# Patient Record
(demographics unavailable — no encounter records)

---

## 2024-10-10 NOTE — PHYSICAL EXAM
[Normal] : affect was normal and insight and judgment were intact [de-identified] : decreased bs in bases, some dullness to percussion on right.  [de-identified] : chest tube sitenearly healed.

## 2024-10-10 NOTE — HISTORY OF PRESENT ILLNESS
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  Hospitalized for severe sepsis due to aspiration pneumonia complicated by loculated pleural effusion requiring R chest tube and pleurodesis as well as AFRVR with spontaneous reversion. Not intubated. Echo low EF ?sepsis vs rate related (less likely) vs restenosis (less likely global HK), started on entresto c/b hypotension requiring dose adjustment.

## 2024-10-10 NOTE — ASSESSMENT
[FreeTextEntry1] : *recurrent aspiratin pna. This occurred soon after liberalization of diet. Has significant sputum production as noted by pt wife, will try to reduce microbial load using Closys gargle bid. Saw speech inpt, cont present diet. Has Pulm f/u, update decub CXR.  *PAF with RVR. Cardiology managing. EKG confirms NSR today. Check hgb 1 week post d/c, bnp for comparison/baseline. Cirilo'g entresto, eliquis, plavix, but wife thinks not taking plavix, COLLEEN 2/2024. Call tomorrow to re check. Update hgb  *T2DM. Gphage decreased inpt, update fructosamine and likely bump dose back up. Will take glucometer back to pharmacy.  *Cardiomyopathy. TTE 3 months planned. 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 Time 35min

## 2024-10-11 NOTE — ASSESSMENT
[Verbal] : Verbal [Demo] : Demo [Patient] : Patient [Spouse] : Spouse [Good - alert, interested, motivated] : Good - alert, interested, motivated [Verbalizes knowledge/Understanding] : Verbalizes knowledge/understanding [Dressing changes] : dressing changes [Skin Care] : skin care [Pressure relief] : pressure relief [Signs and symptoms of infection] : sign and symptoms of infection [Nutrition] : nutrition [How and When to Call] : how and when to call [Pain Management] : pain management [Patient responsibility to plan of care] : patient responsibility to plan of care [Stable] : stable [Home] : Home [Wheelchair] : Wheelchair [Not Applicable - Long Term Care/Home Health Agency] : Long Term Care/Home Health Agency: Not Applicable [] : No [FreeTextEntry2] : Infection prevention Localized wound care  Goal remaining pain free regarding wounds Enzymatic debridement  Frequent turning and repositioning  [FreeTextEntry3] : Increase in fibrinous wound base tissue and increase in measurements [FreeTextEntry4] : Supply order requested, wife performs dressing changes. Follow up in 1 week

## 2024-10-11 NOTE — HISTORY OF PRESENT ILLNESS
[FreeTextEntry1] : 74 yo WM, here for f/u of sacral press. ulcer that was first noticed in 8/24. Last visit here was 8/26/24. In the interim, pt was hospitalized recently for 2 wks with pneumonia.  Receiving PT at home. The ulcer is now completely covered with yellow slough. I emphasized to the pt/wife of the importance of offload/frequent change of position/rotation while in bed or chair and to increase standing/ambulation if possible and as much as possible throughout the day/night.

## 2024-10-11 NOTE — REVIEW OF SYSTEMS
[Negative] : Psychiatric [Feeling Tired] : feeling tired [Skin Wound] : skin wound [Limb Weakness] : limb weakness [Difficulty Walking] : difficulty walking [FreeTextEntry2] : CVA weakness [FreeTextEntry5] : hypertension, NSTEMI, hyperlipidemia [FreeTextEntry6] : sleep apnea [FreeTextEntry7] : GERD [FreeTextEntry8] : Nocturia,BPH [de-identified] : ulcer left lateral thigh [de-identified] : stroke [de-identified] : DM2

## 2024-10-11 NOTE — PLAN
[FreeTextEntry1] : offload; frequent rotation/change of position; increase standing/ambul. with assist cont PT collagenase, DD qd F/u- 2 wks  time spent 25 mins.

## 2024-10-11 NOTE — PHYSICAL EXAM
[Normal Thyroid] : the thyroid was normal [Normal Breath Sounds] : Normal breath sounds [Normal Heart Sounds] : normal heart sounds [Normal Rate and Rhythm] : normal rate and rhythm [Alert] : alert [Oriented to Person] : oriented to person [Oriented to Place] : oriented to place [Oriented to Time] : oriented to time [Calm] : calm [JVD] : no jugular venous distention  [Abdomen Masses] : No abdominal massess [Abdomen Tenderness] : ~T ~M No abdominal tenderness [Tender] : nontender [Enlarged] : not enlarged [de-identified] : elderly WM, NAD, alert, Ox3. [FreeTextEntry1] : sacrum  [FreeTextEntry2] : 1.1 [FreeTextEntry3] : 0.4 [FreeTextEntry4] : 0.1 [de-identified] : serosanguinous  [de-identified] : reactive hyperemia, resolved blanchable erythema when patient was offloading pressure [de-identified] : 100% [de-identified] : Santyl, Allevyn Foam [de-identified] : Mechanically cleansed with sterile gauze and normal saline. Cloth tape  [TWNoteComboBox4] : Small [TWNoteComboBox5] : No [TWNoteComboBox6] : Pressure [de-identified] : No [de-identified] : Normal [de-identified] : None [de-identified] : None [de-identified] : None [de-identified] : Yes [de-identified] : Daily [de-identified] : Primary Dressing

## 2024-10-11 NOTE — VITALS
[Pain related to present condition?] : The patient's  pain is not related to present condition. [] : No [de-identified] : 0/10

## 2024-10-14 NOTE — ASSESSMENT
[FreeTextEntry1] : Impression: This 73-year-old male patient is status post remote posterior circulation infarcts with signs of posterior circulation dysfunction predominantly affecting brainstem and cerebellum.  There is no evidence of recent CVA.  He has significant issues with speech and swallowing with recurrent aspiration pneumonia and recent hospitalization for loculated pleural effusion.  He also has a sacral decubitus.  There are other medical comorbidities including CAD status post MI stents new onset atrial fibrillation diabetes hyperlipidemia hypertension.  There has been weight loss and is significantly deconditioned following the recent hospital stay.    Recommendations: Continue baclofen 10 mg twice daily for recurrent hiccups spasticity.  Pulmonary follow-up.  Wound care follow-up.  Speech and physical therapy.  Aspiration precautions.  Multiple medications reviewed.  Guarded prognosis.  Office follow-up.

## 2024-10-14 NOTE — HISTORY OF PRESENT ILLNESS
[FreeTextEntry1] : This patient is seen for an office visit.  He is accompanied by his wife.  He is a 73-year-old patient known well to me status post recurrent posterior circulation strokes.  In this setting he has had recurrent aspiration pneumonia and was recently hospitalized from 9/18/2020 4 through 10-24.  He did have evidence of aspiration pneumonia and loculated pleural effusion requiring a chest tube and pleurodesis.  He is also been receiving treatment for sacral decubitus.  He has been deconditioned and is receiving laterally speech but physical therapy.  He continues to receive baclofen 10 mg twice daily for recurrent hiccups and spasticity.  Botox was given for left arm spasticity and was ineffective.  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   There is no evidence suggest recurrent CVA.  He has essentially retained cognitive functioning though his speech is dysarthric and ataxic.  He has chronic visual complaints left hemiparesis and right Kevin ataxia all chronic.  There is also a history of CAD cardiac stents hyperlipidemia diabetes.  At the time of the recent admission for the pulmonary condition was found to be in atrial fibrillation started on Eliquis and also low doses of Entresto.  His metformin was increased.

## 2024-10-14 NOTE — PHYSICAL EXAM
[FreeTextEntry1] : Head:  Normocephalic Neck: Restricted no carotid bruit.  Mental Status:  Alert Oriented X3 dysarthric hypophonic ataxic speech.  Cranial Nerves:  PERRL, extraocular movements reveal prominent nystagmus unchanged with no complaint of diplopia.  Visual fields full to confrontation.  No facial weakness.  Tongue deviates to the right without atrophy or fasciculation of the palate made symmetrically as a positive gag.  Motor: Chronic spastic left hemiparesis and right Kevin ataxia unchanged.  DTRs: Left Babinski.  Sensory: Response to pin and touch equally.  Gait: Wheelchair. Previously Declined (within the last year)

## 2024-10-14 NOTE — REASON FOR VISIT
[Follow-Up: _____] : a [unfilled] follow-up visit [FreeTextEntry1] : Status post remote posterior circulation CVAs

## 2024-10-25 NOTE — PHYSICAL EXAM
[Normal Thyroid] : the thyroid was normal [Normal Breath Sounds] : Normal breath sounds [Normal Heart Sounds] : normal heart sounds [Normal Rate and Rhythm] : normal rate and rhythm [Alert] : alert [Oriented to Person] : oriented to person [Oriented to Place] : oriented to place [Oriented to Time] : oriented to time [Calm] : calm [JVD] : no jugular venous distention  [Abdomen Masses] : No abdominal massess [Abdomen Tenderness] : ~T ~M No abdominal tenderness [Tender] : nontender [Enlarged] : not enlarged [de-identified] : elderly WM, NAD, alert, Ox3. [FreeTextEntry1] : sacrum  [FreeTextEntry2] : 0.9 [FreeTextEntry3] : 0.4 [FreeTextEntry4] : 0.1 [de-identified] : serosanguinous  [de-identified] : reactive hyperemia, resolved blanchable erythema when patient was offloading pressure [de-identified] : 100% [de-identified] : Santyl, Allevyn Foam [de-identified] : Mechanically cleansed with sterile gauze and normal saline. Cloth tape  [TWNoteComboBox4] : Small [TWNoteComboBox5] : No [TWNoteComboBox6] : Pressure [de-identified] : No [de-identified] : Normal [de-identified] : None [de-identified] : None [de-identified] : None [de-identified] : Yes [de-identified] : Daily [de-identified] : Primary Dressing

## 2024-10-25 NOTE — PLAN
[FreeTextEntry1] : increase standing/ambul. offload; frequent rotation/change of position. collagenase, allevyn qd  f/u 2 wks  time spent 20 mins.

## 2024-10-25 NOTE — HISTORY OF PRESENT ILLNESS
[FreeTextEntry1] : 74 yo WM, here for f/u of sacral press. ulcer that was first noticed in 8/24.Pt was hospitalized recently for 2 wks with pneumonia. Receiving PT at home. The ulcer is still covered with yellow slough, but has contracted somewhat. No SOI.  I emphasized to the pt/wife of the importance of offload/frequent change of position/rotation while in bed or chair and to increase standing/ambulation if possible and as much as possible throughout the day/night.

## 2024-10-25 NOTE — VITALS
[Pain related to present condition?] : The patient's  pain is not related to present condition. [] : No [de-identified] : 0/10 [FreeTextEntry5] :  Medication reconciliation completed

## 2024-10-25 NOTE — ASSESSMENT
[Verbal] : Verbal [Demo] : Demo [Patient] : Patient [Spouse] : Spouse [Good - alert, interested, motivated] : Good - alert, interested, motivated [Verbalizes knowledge/Understanding] : Verbalizes knowledge/understanding [Dressing changes] : dressing changes [Skin Care] : skin care [Pressure relief] : pressure relief [Signs and symptoms of infection] : sign and symptoms of infection [Nutrition] : nutrition [How and When to Call] : how and when to call [Pain Management] : pain management [Patient responsibility to plan of care] : patient responsibility to plan of care [Stable] : stable [Home] : Home [Wheelchair] : Wheelchair [Not Applicable - Long Term Care/Home Health Agency] : Long Term Care/Home Health Agency: Not Applicable [] : No [FreeTextEntry2] : Infection prevention Localized wound care  Goal remaining pain free regarding wounds Enzymatic debridement  Frequent turning and repositioning  [FreeTextEntry3] : Increase in fibrinous wound base tissue and increase in measurements [FreeTextEntry4] : Pt has adequate supply to change the dressings as ordered Follow up in 2 weeks

## 2024-10-25 NOTE — REVIEW OF SYSTEMS
[Feeling Tired] : feeling tired [Skin Wound] : skin wound [Limb Weakness] : limb weakness [Difficulty Walking] : difficulty walking [Negative] : Psychiatric [FreeTextEntry2] : CVA weakness [FreeTextEntry5] : hypertension, NSTEMI, hyperlipidemia [FreeTextEntry6] : sleep apnea [FreeTextEntry7] : GERD [FreeTextEntry8] : Nocturia,BPH [de-identified] : ulcer left lateral thigh [de-identified] : stroke [de-identified] : DM2

## 2024-11-08 NOTE — HISTORY OF PRESENT ILLNESS
[FreeTextEntry1] : 74 yo WM, here with his wife, for f/u of sacral press. ulcer that was first noticed in 8/24.Pt was hospitalized recently for 2 wks with pneumonia. Receiving PT at home. The ulcer is still covered with yellow slough, but has contracted somewhat. No SOI. I emphasized to the pt/wife of the importance of offload/frequent change of position/rotation while in bed or chair and to increase standing/ambulation if possible and as much as possible throughout the day/night.

## 2024-11-08 NOTE — REVIEW OF SYSTEMS
[Feeling Tired] : feeling tired [Skin Wound] : skin wound [Limb Weakness] : limb weakness [Difficulty Walking] : difficulty walking [Negative] : Psychiatric [FreeTextEntry2] : CVA weakness [FreeTextEntry5] : hypertension, NSTEMI, hyperlipidemia [FreeTextEntry6] : sleep apnea [FreeTextEntry7] : GERD [FreeTextEntry8] : Nocturia,BPH [de-identified] : ulcer left lateral thigh [de-identified] : stroke [de-identified] : DM2

## 2024-11-08 NOTE — VITALS
[Pain related to present condition?] : The patient's  pain is not related to present condition. [] : No [de-identified] : 0/10 [FreeTextEntry5] :  Medication reconciliation completed

## 2024-11-08 NOTE — HISTORY OF PRESENT ILLNESS
[FreeTextEntry1] : 72 yo WM, here with his wife, for f/u of sacral press. ulcer that was first noticed in 8/24.Pt was hospitalized recently for 2 wks with pneumonia. Receiving PT at home. The ulcer is still covered with yellow slough, but has contracted somewhat. No SOI. I emphasized to the pt/wife of the importance of offload/frequent change of position/rotation while in bed or chair and to increase standing/ambulation if possible and as much as possible throughout the day/night.

## 2024-11-08 NOTE — PLAN
[FreeTextEntry1] : offload;frequent rotation/change of position increase standing/ambulation with assist collagenase, allevyn qd  F/u-2 wks  time spent 25 mins.

## 2024-11-08 NOTE — PHYSICAL EXAM
[Normal Thyroid] : the thyroid was normal [Normal Breath Sounds] : Normal breath sounds [Normal Heart Sounds] : normal heart sounds [Normal Rate and Rhythm] : normal rate and rhythm [Alert] : alert [Oriented to Person] : oriented to person [Oriented to Place] : oriented to place [Oriented to Time] : oriented to time [Calm] : calm [JVD] : no jugular venous distention  [Abdomen Masses] : No abdominal massess [Abdomen Tenderness] : ~T ~M No abdominal tenderness [Tender] : nontender [Enlarged] : not enlarged [de-identified] : adult WM, NAD, alert, Ox3. [FreeTextEntry1] : sacrum  [FreeTextEntry2] : 0.9 [FreeTextEntry3] : 0.4 [FreeTextEntry4] : 0.1 [de-identified] : serosanguinous  [de-identified] : reactive hyperemia, resolved blanchable erythema when patient was offloading pressure [de-identified] : 100% [de-identified] : Santyl, Allevyn Foam [de-identified] : Mechanically cleansed with sterile gauze and normal saline. Cloth tape  [TWNoteComboBox4] : Small [TWNoteComboBox5] : No [TWNoteComboBox6] : Pressure [de-identified] : No [de-identified] : Normal [de-identified] : None [de-identified] : None [de-identified] : None [de-identified] : Yes [de-identified] : Daily [de-identified] : Primary Dressing

## 2024-11-08 NOTE — VITALS
[Pain related to present condition?] : The patient's  pain is not related to present condition. [] : No [de-identified] : 0/10 [FreeTextEntry5] :  Medication reconciliation completed

## 2024-11-08 NOTE — REVIEW OF SYSTEMS
[Feeling Tired] : feeling tired [Skin Wound] : skin wound [Limb Weakness] : limb weakness [Difficulty Walking] : difficulty walking [Negative] : Psychiatric [FreeTextEntry2] : CVA weakness [FreeTextEntry5] : hypertension, NSTEMI, hyperlipidemia [FreeTextEntry6] : sleep apnea [FreeTextEntry7] : GERD [FreeTextEntry8] : Nocturia,BPH [de-identified] : ulcer left lateral thigh [de-identified] : stroke [de-identified] : DM2

## 2024-11-08 NOTE — PHYSICAL EXAM
[Normal Thyroid] : the thyroid was normal [Normal Breath Sounds] : Normal breath sounds [Normal Heart Sounds] : normal heart sounds [Normal Rate and Rhythm] : normal rate and rhythm [Alert] : alert [Oriented to Person] : oriented to person [Oriented to Place] : oriented to place [Oriented to Time] : oriented to time [Calm] : calm [JVD] : no jugular venous distention  [Abdomen Masses] : No abdominal massess [Abdomen Tenderness] : ~T ~M No abdominal tenderness [Tender] : nontender [Enlarged] : not enlarged [de-identified] : adult WM, NAD, alert, Ox3. [FreeTextEntry1] : sacrum  [FreeTextEntry2] : 0.9 [FreeTextEntry3] : 0.4 [FreeTextEntry4] : 0.1 [de-identified] : serosanguinous  [de-identified] : reactive hyperemia, resolved blanchable erythema when patient was offloading pressure [de-identified] : 100% [de-identified] : Santyl, Allevyn Foam [de-identified] : Mechanically cleansed with sterile gauze and normal saline. Cloth tape  [TWNoteComboBox4] : Small [TWNoteComboBox5] : No [TWNoteComboBox6] : Pressure [de-identified] : No [de-identified] : Normal [de-identified] : None [de-identified] : None [de-identified] : None [de-identified] : Yes [de-identified] : Daily [de-identified] : Primary Dressing

## 2024-11-08 NOTE — ASSESSMENT
[Verbal] : Verbal [Demo] : Demo [Patient] : Patient [Spouse] : Spouse [Good - alert, interested, motivated] : Good - alert, interested, motivated [Verbalizes knowledge/Understanding] : Verbalizes knowledge/understanding [Dressing changes] : dressing changes [Skin Care] : skin care [Pressure relief] : pressure relief [Signs and symptoms of infection] : sign and symptoms of infection [Nutrition] : nutrition [How and When to Call] : how and when to call [Pain Management] : pain management [Patient responsibility to plan of care] : patient responsibility to plan of care [] : Yes [Stable] : stable [Home] : Home [Wheelchair] : Wheelchair [Not Applicable - Long Term Care/Home Health Agency] : Long Term Care/Home Health Agency: Not Applicable [FreeTextEntry2] : Infection prevention Localized wound care  Goal remaining pain free regarding wounds Enzymatic debridement  Frequent turning and repositioning  [FreeTextEntry4] : Pt has adequate supply to change the dressings as ordered Follow up in 2 weeks

## 2024-11-22 NOTE — VITALS
[Pain related to present condition?] : The patient's  pain is not related to present condition. [] : No [de-identified] : 0/10

## 2024-11-22 NOTE — VITALS
[Pain related to present condition?] : The patient's  pain is not related to present condition. [] : No [de-identified] : 0/10

## 2024-11-22 NOTE — REVIEW OF SYSTEMS
[Feeling Tired] : feeling tired [Skin Wound] : skin wound [Limb Weakness] : limb weakness [Difficulty Walking] : difficulty walking [Negative] : Psychiatric [FreeTextEntry2] : CVA weakness [FreeTextEntry5] : hypertension, NSTEMI, hyperlipidemia [FreeTextEntry6] : sleep apnea [FreeTextEntry7] : GERD [FreeTextEntry8] : Nocturia,BPH [de-identified] : stroke [de-identified] : ulcer left lateral thigh [de-identified] : DM2

## 2024-11-22 NOTE — REVIEW OF SYSTEMS
[Feeling Tired] : feeling tired [Skin Wound] : skin wound [Limb Weakness] : limb weakness [Difficulty Walking] : difficulty walking [Negative] : Psychiatric [FreeTextEntry2] : CVA weakness [FreeTextEntry5] : hypertension, NSTEMI, hyperlipidemia [FreeTextEntry6] : sleep apnea [FreeTextEntry7] : GERD [FreeTextEntry8] : Nocturia,BPH [de-identified] : ulcer left lateral thigh [de-identified] : stroke [de-identified] : DM2

## 2024-11-22 NOTE — PHYSICAL EXAM
[2 x 2] : 2 x 2  [JVD] : no jugular venous distention  [Normal Thyroid] : the thyroid was normal [Normal Breath Sounds] : Normal breath sounds [Normal Heart Sounds] : normal heart sounds [Normal Rate and Rhythm] : normal rate and rhythm [Abdomen Masses] : No abdominal massess [Abdomen Tenderness] : ~T ~M No abdominal tenderness [Tender] : nontender [Enlarged] : not enlarged [Alert] : alert [Oriented to Person] : oriented to person [Oriented to Place] : oriented to place [Oriented to Time] : oriented to time [Calm] : calm [de-identified] : elderly WM, NAD, alert, Ox3 [FreeTextEntry1] : sacrum  [FreeTextEntry2] : 0.9 [FreeTextEntry3] : 0.4 [FreeTextEntry4] : 0.1 [de-identified] : serosanguinous  [de-identified] : reactive hyperemia, resolved blanchable erythema when patient was offloading pressure [de-identified] : 100% [de-identified] : Santyl, Allevyn Foam [de-identified] : Mechanically cleansed with sterile gauze and normal saline. Cloth tape  [TWNoteComboBox4] : Small [TWNoteComboBox5] : No [TWNoteComboBox6] : Pressure [de-identified] : No [de-identified] : Normal [de-identified] : None [de-identified] : None [de-identified] : None [de-identified] : Yes [de-identified] : Daily [de-identified] : Primary Dressing

## 2024-11-22 NOTE — PLAN
[FreeTextEntry1] : increase standing/ambulation. collagenase, DD, allevyn qd  f/u 2 wks  time spent-25 mins.

## 2024-11-22 NOTE — PHYSICAL EXAM
[2 x 2] : 2 x 2  [JVD] : no jugular venous distention  [Normal Thyroid] : the thyroid was normal [Normal Breath Sounds] : Normal breath sounds [Normal Heart Sounds] : normal heart sounds [Normal Rate and Rhythm] : normal rate and rhythm [Abdomen Masses] : No abdominal massess [Abdomen Tenderness] : ~T ~M No abdominal tenderness [Tender] : nontender [Enlarged] : not enlarged [Alert] : alert [Oriented to Person] : oriented to person [Oriented to Place] : oriented to place [Oriented to Time] : oriented to time [Calm] : calm [de-identified] : elderly WM, NAD, alert, Ox3 [FreeTextEntry1] : sacrum  [FreeTextEntry2] : 0.9 [FreeTextEntry3] : 0.4 [FreeTextEntry4] : 0.1 [de-identified] : serosanguinous  [de-identified] : reactive hyperemia, resolved blanchable erythema when patient was offloading pressure [de-identified] : 100% [de-identified] : Santyl, Allevyn Foam [de-identified] : Mechanically cleansed with sterile gauze and normal saline. Cloth tape  [TWNoteComboBox4] : Small [TWNoteComboBox5] : No [TWNoteComboBox6] : Pressure [de-identified] : No [de-identified] : Normal [de-identified] : None [de-identified] : None [de-identified] : None [de-identified] : Yes [de-identified] : Daily [de-identified] : Primary Dressing

## 2024-12-06 NOTE — REVIEW OF SYSTEMS
[FreeTextEntry2] : CVA weakness [FreeTextEntry5] : hypertension, NSTEMI, hyperlipidemia [FreeTextEntry6] : sleep apnea [FreeTextEntry7] : GERD [FreeTextEntry8] : Nocturia,BPH [de-identified] : ulcer left lateral thigh [de-identified] : stroke [de-identified] : DM2

## 2024-12-06 NOTE — VITALS
[Pain related to present condition?] : The patient's  pain is not related to present condition. [] : No [de-identified] : 0/10

## 2024-12-06 NOTE — VITALS
[Pain related to present condition?] : The patient's  pain is not related to present condition. [] : No [de-identified] : 0/10

## 2024-12-06 NOTE — REVIEW OF SYSTEMS
[FreeTextEntry2] : CVA weakness [FreeTextEntry5] : hypertension, NSTEMI, hyperlipidemia [FreeTextEntry6] : sleep apnea [FreeTextEntry7] : GERD [FreeTextEntry8] : Nocturia,BPH [de-identified] : ulcer left lateral thigh [de-identified] : stroke [de-identified] : DM2

## 2024-12-06 NOTE — HISTORY OF PRESENT ILLNESS
[FreeTextEntry1] : 74 yo WM, here with his wife, for f/u of sacral press. ulcer that was first noticed in 8/24.Pt was hospitalized recently for 2 wks with pneumonia. Receiving PT at home. The ulcer is still covered with yellow slough, but has contracted somewhat. No SOI. I emphasized to the pt/wife of the importance of offload/frequent change of position/rotation while in bed or chair and to increase standing/ambulation if possible and as much as possible throughout the day/night.      Also with a new stage 1 right heel press. ulcer. Advised wearing heel boots while in bed and w/c.

## 2024-12-06 NOTE — PLAN
[FreeTextEntry1] : offload; frequent rotation/change of position increase standing/ambul. heel boots while in bed/and wheel chair collagenase, allevyn qd f/u 2 wks  time spent 25 mins.

## 2024-12-06 NOTE — ASSESSMENT
[Verbal] : Verbal [Demo] : Demo [Patient] : Patient [Spouse] : Spouse [Good - alert, interested, motivated] : Good - alert, interested, motivated [Verbalizes knowledge/Understanding] : Verbalizes knowledge/understanding [Dressing changes] : dressing changes [Skin Care] : skin care [Pressure relief] : pressure relief [Signs and symptoms of infection] : sign and symptoms of infection [Nutrition] : nutrition [How and When to Call] : how and when to call [Pain Management] : pain management [Patient responsibility to plan of care] : patient responsibility to plan of care [] : Yes [Stable] : stable [Home] : Home [Wheelchair] : Wheelchair [Not Applicable - Long Term Care/Home Health Agency] : Long Term Care/Home Health Agency: Not Applicable [Family member] : Family member [FreeTextEntry2] : Infection prevention Localized wound care  Goal remaining pain free regarding wounds Enzymatic debridement  Frequent turning and repositioning  [FreeTextEntry4] : Pt has adequate supply to change the dressings as ordered Follow up in 2 weeks

## 2024-12-06 NOTE — PHYSICAL EXAM
[2 x 2] : 2 x 2  [JVD] : no jugular venous distention  [Ankle Swelling (On Exam)] : not present [Abdomen Masses] : No abdominal massess [Abdomen Tenderness] : ~T ~M No abdominal tenderness [Tender] : nontender [Enlarged] : not enlarged [de-identified] : elderly WM, NAD, alert, Ox3. [FreeTextEntry2] : 0.7 [FreeTextEntry1] : sacrum  [FreeTextEntry3] : 0.2 [FreeTextEntry4] : 0.1 [de-identified] : serosanguinous  [de-identified] : 50% [de-identified] : Santyl, Allevyn Foam [de-identified] : Mechanically cleansed with sterile gauze and normal saline. Cloth tape  [TWNoteComboBox4] : Small [TWNoteComboBox5] : No [TWNoteComboBox6] : Pressure [de-identified] : No [de-identified] : Normal [de-identified] : None [de-identified] : None [de-identified] : 50% [de-identified] : Yes [de-identified] : Daily [de-identified] : Primary Dressing

## 2024-12-06 NOTE — PHYSICAL EXAM
[2 x 2] : 2 x 2  [JVD] : no jugular venous distention  [Ankle Swelling (On Exam)] : not present [Abdomen Masses] : No abdominal massess [Abdomen Tenderness] : ~T ~M No abdominal tenderness [Tender] : nontender [Enlarged] : not enlarged [de-identified] : elderly WM, NAD, alert, Ox3. [FreeTextEntry2] : 0.7 [FreeTextEntry1] : sacrum  [FreeTextEntry3] : 0.2 [FreeTextEntry4] : 0.1 [de-identified] : serosanguinous  [de-identified] : 50% [de-identified] : Santyl, Allevyn Foam [de-identified] : Mechanically cleansed with sterile gauze and normal saline. Cloth tape  [TWNoteComboBox4] : Small [TWNoteComboBox5] : No [TWNoteComboBox6] : Pressure [de-identified] : No [de-identified] : Normal [de-identified] : None [de-identified] : None [de-identified] : 50% [de-identified] : Yes [de-identified] : Daily [de-identified] : Primary Dressing

## 2024-12-21 NOTE — REVIEW OF SYSTEMS
[Feeling Tired] : feeling tired [Limb Weakness] : limb weakness [Difficulty Walking] : difficulty walking [Negative] : Psychiatric [FreeTextEntry2] : CVA weakness [FreeTextEntry5] : hypertension, NSTEMI, hyperlipidemia [FreeTextEntry6] : sleep apnea [FreeTextEntry7] : GERD [FreeTextEntry8] : Nocturia,BPH [de-identified] : stroke [de-identified] : ulcer left lateral thigh [de-identified] : DM2

## 2024-12-21 NOTE — ASSESSMENT
[Verbal] : Verbal [Demo] : Demo [Patient] : Patient [Spouse] : Spouse [Family member] : Family member [Good - alert, interested, motivated] : Good - alert, interested, motivated [Verbalizes knowledge/Understanding] : Verbalizes knowledge/understanding [Dressing changes] : dressing changes [Skin Care] : skin care [Pressure relief] : pressure relief [Signs and symptoms of infection] : sign and symptoms of infection [Nutrition] : nutrition [How and When to Call] : how and when to call [Pain Management] : pain management [Patient responsibility to plan of care] : patient responsibility to plan of care [] : Yes [Stable] : stable [Home] : Home [Wheelchair] : Wheelchair [Not Applicable - Long Term Care/Home Health Agency] : Long Term Care/Home Health Agency: Not Applicable [FreeTextEntry2] : Infection prevention Localized wound care  Goal remaining pain free regarding wounds Frequent turning and repositioning  [FreeTextEntry4] : Pt has adequate supply to change the dressings as ordered Follow up in 3 weeks

## 2024-12-21 NOTE — PHYSICAL EXAM
[2 x 2] : 2 x 2  [Normal Thyroid] : the thyroid was normal [Normal Breath Sounds] : Normal breath sounds [Normal Heart Sounds] : normal heart sounds [Normal Rate and Rhythm] : normal rate and rhythm [Ankle Swelling (On Exam)] : present [Ankle Swelling Bilaterally] : bilaterally  [Ankle Swelling On The Left] : moderate [Alert] : alert [Oriented to Person] : oriented to person [Oriented to Place] : oriented to place [Oriented to Time] : oriented to time [Calm] : calm [JVD] : no jugular venous distention  [Abdomen Masses] : No abdominal massess [Abdomen Tenderness] : ~T ~M No abdominal tenderness [Tender] : nontender [Enlarged] : not enlarged [de-identified] : elderly WM, NAD, alert, Ox3 [FreeTextEntry1] : sacrum- fragile epithelial tissue [de-identified] :  Allevyn Foam [de-identified] : Mechanically cleansed with sterile gauze and normal saline. Cloth tape  [TWNoteComboBox4] : None [TWNoteComboBox5] : No [TWNoteComboBox6] : Pressure [de-identified] : No [de-identified] : Normal [de-identified] : None [de-identified] : None [de-identified] : None [de-identified] : No [de-identified] : Every other day [de-identified] : Primary Dressing

## 2024-12-21 NOTE — HISTORY OF PRESENT ILLNESS
[FreeTextEntry1] : 72 yo WM, here with his wife, for f/u of sacral press. ulcer that was first noticed in 8/24.Pt was hospitalized recently for 2 wks with pneumonia. Receiving PT at home. The ulcer has healed.  I emphasized to the pt/wife of the importance of offload/frequent change of position/rotation while in bed or chair and to increase standing/ambulation if possible and as much as possible throughout the day/night.      The left heel stage 1 press. ulcer is stable and skin remains intact. Wearing heel boots. Using roho cushion in his wheelchair.

## 2024-12-21 NOTE — PLAN
[FreeTextEntry1] : offload; frequent rotation/change of position heel boots allevyn qod f/u 3 wks  time spent 25 mins.

## 2025-01-11 NOTE — ASSESSMENT
[Verbal] : Verbal [Written] : Written [Demo] : Demo [Patient] : Patient [Spouse] : Spouse [Good - alert, interested, motivated] : Good - alert, interested, motivated [Demonstrates independently] : demonstrates independently [Skin Care] : skin care [Pressure relief] : pressure relief [Signs and symptoms of infection] : sign and symptoms of infection [Nutrition] : nutrition [How and When to Call] : how and when to call [Patient responsibility to plan of care] : patient responsibility to plan of care [] : Yes [FreeTextEntry2] : Infection prevention  Maintain optimal skin integrity to high pressure areas Pressure relief/ Pressure redistribution Offloading the stress on skin structures and decreasing potential pathologic biomechanical influences. Frequent turning and repositioning q 2 hrs. [FreeTextEntry4] : Wound closed

## 2025-01-11 NOTE — REVIEW OF SYSTEMS
[Feeling Tired] : feeling tired [Limb Weakness] : limb weakness [Difficulty Walking] : difficulty walking [Negative] : Psychiatric [FreeTextEntry2] : CVA weakness [FreeTextEntry5] : hypertension, NSTEMI, hyperlipidemia [FreeTextEntry6] : sleep apnea [FreeTextEntry7] : GERD [FreeTextEntry8] : Nocturia,BPH [de-identified] : ulcer left lateral thigh [de-identified] : stroke [de-identified] : DM2

## 2025-01-11 NOTE — PHYSICAL EXAM
[JVD] : no jugular venous distention  [Normal Thyroid] : the thyroid was normal [Normal Breath Sounds] : Normal breath sounds [Normal Heart Sounds] : normal heart sounds [Normal Rate and Rhythm] : normal rate and rhythm [Ankle Swelling (On Exam)] : present [Ankle Swelling Bilaterally] : bilaterally  [Ankle Swelling On The Left] : moderate [Abdomen Masses] : No abdominal massess [Abdomen Tenderness] : ~T ~M No abdominal tenderness [Tender] : nontender [Enlarged] : not enlarged [Alert] : alert [Oriented to Person] : oriented to person [Oriented to Place] : oriented to place [Oriented to Time] : oriented to time [Calm] : calm [de-identified] : elderly WM, NAD, alert, Ox3 [de-identified] : No open wounds [FreeTextEntry1] : Sacral / Decubitus ulcer; closed [de-identified] : none [de-identified] : pressure [de-identified] : blanchable erythema [de-identified] : none [de-identified] : none [de-identified] : none [de-identified] : none [TWNoteComboBox4] : None [de-identified] : No

## 2025-01-11 NOTE — PLAN
[FreeTextEntry1] : offload; frequent rotation/change of position all areas resolved.  may continue allevyn foam discharged  time spent 25 mins.

## 2025-01-11 NOTE — HISTORY OF PRESENT ILLNESS
[FreeTextEntry1] : 74 yo WM, here with his wife, for f/u of sacral press. ulcer that was first noticed in 8/24.Pt was hospitalized recently for 2 wks with pneumonia. Receiving PT at home. The ulcer has healed.  I emphasized to the pt/wife of the importance of offload/frequent change of position/rotation while in bed or chair and to increase standing/ambulation if possible and as much as possible throughout the day/night.      The left heel stage 1 press. ulcer is stable and skin remains intact. Wearing heel boots. Using roho cushion in his wheelchair. 1/11/25 both left heel and sacral area s resolved. No SOI

## 2025-01-31 NOTE — HISTORY OF PRESENT ILLNESS
[FreeTextEntry1] : inferior wall MI - late presentation s/p RCA PCI Stress test with mult regions of ischemia - s/p PCI to OM 2/24 Recent admission for PNA and new AF  Feels well No CP No LE edema Compliant with meds No dizziness or palps

## 2025-01-31 NOTE — DISCUSSION/SUMMARY
[EKG obtained to assist in diagnosis and management of assessed problem(s)] : EKG obtained to assist in diagnosis and management of assessed problem(s) [FreeTextEntry1] : Increase entresto (full pill BID) c/w Eliquis/Plavix CAD - no angina, (s/p recent PCI to OM, LAD disease noted) HTN- BP ok to titrate dose Hchol-  c/w current meds  Echo reviewed  RTO 4-6 mo

## 2025-02-18 NOTE — REASON FOR VISIT
[Follow-Up: _____] : a [unfilled] follow-up visit [FreeTextEntry1] : Status post remote posterior circulation strokes

## 2025-02-18 NOTE — ASSESSMENT
[FreeTextEntry1] : Impression: This 74-year-old male patient is status post remote multiple posterior circulation infarcts involving the brainstem and cerebellum.  He has no evidence of recent CVA and is neurologically stable.  He has issues with speech swallowing recurrent aspiration pneumonia status post sacral decubitus resolved status post MI stents atrial fibrillation diabetes hyperlipidemia hypertension now taking Entresto.  Recommendations: Continue baclofen 10 mg twice daily and his multiple medications have been reviewed would make no change from the neurological standpoint.  Speech therapy physical therapy aspiration precautions office follow-up.

## 2025-02-18 NOTE — HISTORY OF PRESENT ILLNESS
[FreeTextEntry1] : Patient is seen for an office visit accompanied by his wife.  He is a 73-year-old male patient last seen by me on 10/14/2024 status post recurrent strokes affecting brainstem and cerebellum.  He has a history of recurrent pneumonia presumed to be aspiration but stable in this regard since his last visit on 10/14/2024.  He is now seeing a different pulmonologist.  He has a history of CAD status post MI and follows with cardiology.  There is a history of diabetes.  His sacral decubitus has healed and he is no longer seeing wound care.  He receives physical therapy.  He receives speech therapy.  He is now eating solid food chopped instead of puree at his request without difficulty.  He is at chronic visual problems with the right eye for which he follows regularly ophthalmology  He continues to receive baclofen 10 mg twice daily for history of recurrent hiccups and spasticity.  He has had Botox trials for left arm spasticity without effect.  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  There is a history of CAD cardiac stents hyperlipidemia diabetes atrial fibrillation aspiration pneumonia.

## 2025-02-18 NOTE — PHYSICAL EXAM
[FreeTextEntry1] : Head:  Normocephalic Neck: Restricted no carotid bruit.  Mental Status:  Alert Oriented X3 dysarthric hypophonic ataxic speech.  Cranial Nerves:  PERRL, extraocular movements reveal prominent nystagmus unchanged with no complaint of diplopia.  Visual fields full to confrontation.  No facial weakness.  Tongue deviates slightly to the right.  Palate elevates symmetrically.  The gag is diminished.  Motor: Chronic spastic left hemiparesis and right Kevin ataxia unchanged.  DTRs: Left Babinski.  Sensory: Response to pin and touch equally.  Gait: Wheelchair.

## 2025-02-21 NOTE — PHYSICAL EXAM
[No CVA Tenderness] : no CVA  tenderness [No Spinal Tenderness] : no spinal tenderness [Kyphosis] : kyphosis [Normal] : affect was normal and insight and judgment were intact [de-identified] : Mild contractures left upper extremity [de-identified] : No decubitus ulcer.  Livedo reticularis. [de-identified] : Dysarthric speech.  Left arm and left leg spasticity.

## 2025-02-21 NOTE — ASSESSMENT
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 Essential complete resolution other than some loss of ability to run, but spontaneous recurrence 1990 possibly related to residual scarring causing Wallenberg syndrome.. Left hemiparesis with spasticity, wheelchair-bound, recurrent aspiration pneumonitis.  *recurrent aspiratin pna. Allergies to weed roaches grasses sees Dr. Wells. 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 Seeing Pulmonologist.  *PAF with RVR. Cardiology managing. EKG confirms NSR today. Check hgb 1 week post d/c, bnp for comparison/baseline. Cirilo'g entresto, eliquis, plavix, but wife thinks not taking plavix, COLLEEN 2/2024. Call tomorrow to re check. Update hgb  *Cardiomyopathy.  *CAD. Late presenting NSTEMI RCA stented 2020. Moderate LAD RCA RPL disease, OM stented 2/2024, TTE 1/2025 55-60% Entresto increased 1/2025 w/o hypotension.  Also denies lightheadedness with position changes.  *Type 2 diabetes. A1c 7.4% 2018 more recently upper sixes on 1000 mg twice daily metformin statin aspirin.  Update labs April 2025 sees eye doctor. No foot ulceration. No stocking paresthesia.  *Urge incontinence. 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 Wears condom cath at night. Sees Dr. Costa.  *Routine adult health maintenance.  Vaccinations: Tdap 2015 Prevnar 20 2022 Shingrix eligible COVID monovalent eligible flu eligible. Colon cancer screening:  Cologuard - June 2019. Limited colonoscopy negative for polyps August 2022. Cologuard this year.  Update annual labs 4/2025.  It was a pleasure to visit with Mr. and Ms. Perry today. Answer all questions best I could. Disposition return for suture removal flu shot Monday. Follow-up 6 months CPE, call labs in a couple of months. [id number]

## 2025-02-21 NOTE — HISTORY OF PRESENT ILLNESS
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 Payton in cardiology for late presenting inferior wall MI status post PCI to the RCA in 2020.

## 2025-04-15 NOTE — HISTORY OF PRESENT ILLNESS
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  Patient had no significant carotid stenosis on 2020 ultrasound.  Sees Dr. Payton in cardiology for late presenting inferior wall MI status post PCI to the RCA in 2020.  Tolerating Entresto up titration.

## 2025-04-15 NOTE — ASSESSMENT
[FreeTextEntry1] : *Right eye proliferative retinopathy.  Question has been raised by his eye doctors regarding atheroembolism apparently, clean carotids by ultrasound 5 years ago. Given R occipital lobe infarct (affecting left visual field), we need protect R visual field as best we can. Exam would not suggest substantial stenosis today however we will update the carotid ultrasound to be sure in order to potentially further protect vision.  Certainly we are aggressively managing his blood pressure cholesterol and diabetes, updating labs to verify this tomorrow.  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 Essential complete resolution other than some loss of ability to run, but spontaneous recurrence 1990 possibly related to residual scarring, atheroembolism, causing Wallenberg syndrome.. Left hemiparesis with spasticity, wheelchair-bound, recurrent aspiration pneumonitis and/or difficulty clearing secretions. MRI 2024: "Numerous bilateral cerebellar hemisphere infarctions, right worse than left. Ex vacuo dilation of fourth ventricle. Chronic moderate size right of midline pontine infarction extending into the right middle cerebral peduncle. Chronic posterior high right frontal lobe cortical infarctions. Chronic right occipital lobe infarction. Moderate volume loss of the midbrain, pooja, and medulla, unchanged. "  *recurrent aspiratin pna. Allergies to weed roaches grasses sees Dr. Wells. 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 Seeing Pulmonologist. However for hypoxia relative to the patient's baseline and abnormal right lung exam will obtain chest x-ray and start him on azithromycin and cefdinir.  I have asked them to increase nebulized saline to hopefully mobilize secretions better to 3 times daily until sats recover, and use a flutter valve after nebulization.  *PAF with RVR. Cardiology managing. Cirilo'g entresto, eliquis, plavix, but wife thinks not taking plavix, COLLEEN 2/2024. Call tomorrow to re check. Update hgb, ferritin  *Cardiomyopathy. *CAD. Late presenting NSTEMI RCA stented 2020. Moderate LAD RCA RPL disease, OM stented 2/2024, TTE 1/2025 55-60% Entresto increased 1/2025 w/o hypotension. Also denies lightheadedness with position changes.  *Type 2 diabetes. A1c 7.4% 2018 more recently upper sixes on 1000 mg twice daily metformin statin aspirin. Update labs April 2025 sees eye doctor. No foot ulceration. No stocking paresthesia.  *Urge incontinence. 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 Wears condom cath at night. Sees Dr. Costa.  *Routine adult health maintenance. Vaccinations: Tdap 2015 Prevnar 20 2022 Shingrix eligible COVID monovalent eligible flu eligible. Colon cancer screening: Cologuard - June 2019. Limited colonoscopy negative for polyps August 2022. Cologuard this year.  Update annual labs 4/2025.  It was a pleasure to visit with . and Ms. Perry today. Answer all questions best I could. Disposition Follow-up 6 months CPE, portal results [id number]

## 2025-04-15 NOTE — HISTORY OF PRESENT ILLNESS
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  Patient had no significant carotid stenosis on 2020 ultrasound.  Sees Dr. Payton in cardiology for late presenting inferior wall MI status post PCI to the RCA in 2020.  Tolerating Entresto up titration.

## 2025-04-15 NOTE — PHYSICAL EXAM
[Normal] : affect was normal and insight and judgment were intact [de-identified] : Repeat pulse oximetry on warm fingers 92%.  More rhonchi than usual with coughing. [de-identified] : Dullness to percussion in the right lung base with coarse rhonchi throughout the right lung. [de-identified] : Brisk carotid upstrokes no carotid bruits

## 2025-04-15 NOTE — ASSESSMENT
[FreeTextEntry1] : *Right eye proliferative retinopathy.  Question has been raised by his eye doctors regarding atheroembolism apparently, clean carotids by ultrasound 5 years ago. Given R occipital lobe infarct (affecting left visual field), we need protect R visual field as best we can. Exam would not suggest substantial stenosis today however we will update the carotid ultrasound to be sure in order to potentially further protect vision.  Certainly we are aggressively managing his blood pressure cholesterol and diabetes, updating labs to verify this tomorrow.  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 Essential complete resolution other than some loss of ability to run, but spontaneous recurrence 1990 possibly related to residual scarring, atheroembolism, causing Wallenberg syndrome.. Left hemiparesis with spasticity, wheelchair-bound, recurrent aspiration pneumonitis and/or difficulty clearing secretions. MRI 2024: "Numerous bilateral cerebellar hemisphere infarctions, right worse than left. Ex vacuo dilation of fourth ventricle. Chronic moderate size right of midline pontine infarction extending into the right middle cerebral peduncle. Chronic posterior high right frontal lobe cortical infarctions. Chronic right occipital lobe infarction. Moderate volume loss of the midbrain, pooja, and medulla, unchanged. "  *recurrent aspiratin pna. Allergies to weed roaches grasses sees Dr. Wells. 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 Seeing Pulmonologist. However for hypoxia relative to the patient's baseline and abnormal right lung exam will obtain chest x-ray and start him on azithromycin and cefdinir.  I have asked them to increase nebulized saline to hopefully mobilize secretions better to 3 times daily until sats recover, and use a flutter valve after nebulization.  *PAF with RVR. Cardiology managing. Cirilo'g entresto, eliquis, plavix, but wife thinks not taking plavix, COLLEEN 2/2024. Call tomorrow to re check. Update hgb, ferritin  *Cardiomyopathy. *CAD. Late presenting NSTEMI RCA stented 2020. Moderate LAD RCA RPL disease, OM stented 2/2024, TTE 1/2025 55-60% Entresto increased 1/2025 w/o hypotension. Also denies lightheadedness with position changes.  *Type 2 diabetes. A1c 7.4% 2018 more recently upper sixes on 1000 mg twice daily metformin statin aspirin. Update labs April 2025 sees eye doctor. No foot ulceration. No stocking paresthesia.  *Urge incontinence. 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 Wears condom cath at night. Sees Dr. Costa.  *Routine adult health maintenance. Vaccinations: Tdap 2015 Prevnar 20 2022 Shingrix eligible COVID monovalent eligible flu eligible. Colon cancer screening: Cologuard - June 2019. Limited colonoscopy negative for polyps August 2022. Cologuard this year.  Update annual labs 4/2025.  It was a pleasure to visit with . and Ms. Perry today. Answer all questions best I could. Disposition Follow-up 6 months CPE, portal results [id number]

## 2025-04-15 NOTE — PHYSICAL EXAM
[Normal] : affect was normal and insight and judgment were intact [de-identified] : Repeat pulse oximetry on warm fingers 92%.  More rhonchi than usual with coughing. [de-identified] : Dullness to percussion in the right lung base with coarse rhonchi throughout the right lung. [de-identified] : Brisk carotid upstrokes no carotid bruits

## 2025-05-01 NOTE — HISTORY OF PRESENT ILLNESS
[FreeTextEntry8] : 74M w/ PMH of CVA - wheelchair bound, pAfib on AC, HTN, DM, presents as a new patient for an acute care visit.  Presenting with worsening chest congestion since yesterday evening, accompanied by a reliable family historian. Family reports audible respiratory sounds and home pulse oximetry readings of 89-90% on room air, lower than his baseline. The patient has a complex respiratory history including recurrent aspiration pneumonia since his stroke, chronic productive cough with clear sputum, and ongoing management with daily antihistamine for excessive drooling, montelukast, and home nebulizer treatments. Despite limited verbal abilities due to his stroke, the patient comprehends questions and communicates effectively through yes/no responses, denying fever, chills, diaphoresis, nausea, vomiting, chest pain, or dyspnea. He participates in regular physical therapy with walker-assisted ambulation, most recently two days ago without reported breathing difficulties. Today, the patient indicates he subjectively "feels fine" despite the objective changes noted by family.

## 2025-05-01 NOTE — END OF VISIT
[FreeTextEntry3] : I personally spent a total of 30 minutes providing evaluation and management services for this patient on the date of service. This included both face-to-face time spent counseling and coordinating care with the patient, as well as non-face-to-face time reviewing any relevant records, analyzing any applicable diagnostic results, and completing medical documentation. Direct patient counseling constituted more than 50% of the total encounter time and included discussing the assessment, treatment options, and addressing the patient's questions regarding their condition and care plan.

## 2025-05-01 NOTE — PLAN
[FreeTextEntry1] : #Bronchitis Patient presents with worsening audible chest congestion and transient home oxygen desaturations (89-90% on room air) per family report. Examination reveals diffuse rhonchi which family notes is chronic but increased in frequency since yesterday. Current vital signs are stable with SpO2 96% on room air and no acute respiratory distress. Clinical picture most consistent with acute viral bronchitis superimposed on chronic post-stroke bronchopulmonary condition with history of recurrent aspiration. Given patient's significant aspiration risk and stroke history, further investigation warranted to rule out pneumonia, though current presentation lacks typical features of bacterial infection (fever, severe hypoxemia, purulent sputum).  -CXR today to rule out pneumonia or other acute pulmonary process -Respiratory viral panel PCR to identify potential viral etiology -Continue home nebulizer treatments as prescribed -Maintain current medications (montelukast, cetirizine) -Implement strict aspiration precautions including upright positioning after meals -Defer antibiotics at this time given stable oxygenation and absence of fever -If symptoms progress, develop fever, or demonstrate consistent oxygen desaturations despite above measures, consider empiric antibiotic therapy or repeat imaging.  -Clear return precautions: seek emergency care for persistent SpO2 <92% while asymptomatic or <88% with symptoms

## 2025-05-01 NOTE — PHYSICAL EXAM
[Normal Sclera/Conjunctiva] : normal sclera/conjunctiva [Normal Oropharynx] : the oropharynx was normal [No Lymphadenopathy] : no lymphadenopathy [No Respiratory Distress] : no respiratory distress  [No Accessory Muscle Use] : no accessory muscle use [Normal] : normal rate, regular rhythm, normal S1 and S2 and no murmur heard [de-identified] : Rhonchi present across all lung fields, no crackles,  [de-identified] : Wheel-chair bound

## 2025-07-19 NOTE — PHYSICAL EXAM
[Well Developed] : well developed [Well Nourished] : well nourished [No Acute Distress] : no acute distress [Frail] : frail [Normal Conjunctiva] : normal conjunctiva [Normal Venous Pressure] : normal venous pressure [No Carotid Bruit] : no carotid bruit [Normal S1, S2] : normal S1, S2 [No Murmur] : no murmur [No Rub] : no rub [No Gallop] : no gallop [Clear Lung Fields] : clear lung fields [Good Air Entry] : good air entry [No Respiratory Distress] : no respiratory distress  [Soft] : abdomen soft [Non Tender] : non-tender [No Masses/organomegaly] : no masses/organomegaly [Normal Bowel Sounds] : normal bowel sounds [Abnormal Gait] : abnormal gait [No Edema] : no edema [No Cyanosis] : no cyanosis [No Clubbing] : no clubbing [No Varicosities] : no varicosities [No Rash] : no rash [No Skin Lesions] : no skin lesions [Alert and Oriented] : alert and oriented [Normal memory] : normal memory [de-identified] : Sitting in wheelchair

## 2025-07-19 NOTE — HISTORY OF PRESENT ILLNESS
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 
08-Jan-2018 13:25

## 2025-07-19 NOTE — DISCUSSION/SUMMARY
[FreeTextEntry1] : c/w  entresto (full pill BID) c/w Eliquis/Plavix CAD - no angina, (s/p recent PCI to OM, LAD disease noted) HTN- BP ok  - discussed with him Hchol-  c/w current meds     RTO 4-6 mo    [EKG obtained to assist in diagnosis and management of assessed problem(s)] : EKG obtained to assist in diagnosis and management of assessed problem(s)

## 2025-07-19 NOTE — PHYSICAL EXAM
[Well Developed] : well developed [Well Nourished] : well nourished [No Acute Distress] : no acute distress [Frail] : frail [Normal Conjunctiva] : normal conjunctiva [Normal Venous Pressure] : normal venous pressure [No Carotid Bruit] : no carotid bruit [Normal S1, S2] : normal S1, S2 [No Murmur] : no murmur [No Rub] : no rub [No Gallop] : no gallop [Clear Lung Fields] : clear lung fields [Good Air Entry] : good air entry [No Respiratory Distress] : no respiratory distress  [Soft] : abdomen soft [Non Tender] : non-tender [No Masses/organomegaly] : no masses/organomegaly [Normal Bowel Sounds] : normal bowel sounds [Abnormal Gait] : abnormal gait [No Edema] : no edema [No Cyanosis] : no cyanosis [No Clubbing] : no clubbing [No Varicosities] : no varicosities [No Rash] : no rash [No Skin Lesions] : no skin lesions [Alert and Oriented] : alert and oriented [Normal memory] : normal memory [de-identified] : Sitting in wheelchair